# Patient Record
Sex: FEMALE | Race: WHITE | NOT HISPANIC OR LATINO | Employment: OTHER | URBAN - METROPOLITAN AREA
[De-identification: names, ages, dates, MRNs, and addresses within clinical notes are randomized per-mention and may not be internally consistent; named-entity substitution may affect disease eponyms.]

---

## 2024-05-15 ENCOUNTER — TELEPHONE (OUTPATIENT)
Dept: GASTROENTEROLOGY | Facility: CLINIC | Age: 74
End: 2024-05-15
Payer: MEDICARE

## 2024-05-15 NOTE — TELEPHONE ENCOUNTER
Returned patients call. [facility] to speak to patient. 5/15/24 LRA       ----- Message from Svitlana Zapata sent at 5/13/2024  2:23 PM CDT -----  Regarding: Appointment  Contact: patient  Per phone call with patient, she stated that she has been having stomach problems with bloating and she would like to schedule an appointment to see the physician.and also schedule for a colonoscopy to be done.  Please return call at [phone].    Thanks,  SJ

## 2024-05-24 ENCOUNTER — TELEPHONE (OUTPATIENT)
Dept: GASTROENTEROLOGY | Facility: CLINIC | Age: 74
End: 2024-05-24
Payer: MEDICARE

## 2024-05-24 NOTE — TELEPHONE ENCOUNTER
----- Message from Nikki Ku sent at 5/24/2024  3:15 PM CDT -----  Contact: Patient  Patient called to consult with nurse or staff regarding an appointment. She states she missed a phone call and would like a call back. She can be reached at [phone]. Thanks/

## 2024-05-24 NOTE — TELEPHONE ENCOUNTER
I contacted patient and asked her how we can help her. Patient states she would like to schedule a colonoscopy. Patient states she is a previous patient of Dr. Ragsdale. She is due for her 5 year colonoscopy. Patient states she is currently not having any issues. Please advise. BF

## 2024-05-31 NOTE — TELEPHONE ENCOUNTER
Patient is due for colonoscopy. She is medicare so would need OV with NP. Schedule OV for colon consult.  HUONG

## 2024-06-04 NOTE — TELEPHONE ENCOUNTER
Called and left patient a message to return my call .  [facility]

## 2024-06-05 ENCOUNTER — TELEPHONE (OUTPATIENT)
Dept: GASTROENTEROLOGY | Facility: CLINIC | Age: 74
End: 2024-06-05
Payer: MEDICARE

## 2024-06-05 NOTE — TELEPHONE ENCOUNTER
----- Message from Shanti Muniz sent at 6/5/2024 10:47 AM CDT -----  Contact: self  Type:  Patient Returning Call    Who Called:Bethanie Wilcox  Who Left Message for Patient:Samanta  Does the patient know what this is regarding?:unsure  Would the patient rather a call back or a response via [employer] [facility]? Call back  Best Call Back Number:[phone]  Additional Information: n/a

## 2024-06-06 NOTE — TELEPHONE ENCOUNTER
Called and spoke with patient and gave her an appt date and she stated they are out of town a lot but surely she would be here .  lukasz

## 2024-06-21 ENCOUNTER — TELEPHONE (OUTPATIENT)
Dept: GASTROENTEROLOGY | Facility: CLINIC | Age: 74
End: 2024-06-21

## 2024-06-21 ENCOUNTER — OFFICE VISIT (OUTPATIENT)
Dept: GASTROENTEROLOGY | Facility: CLINIC | Age: 74
End: 2024-06-21
Payer: MEDICARE

## 2024-06-21 VITALS
WEIGHT: 145.81 LBS | SYSTOLIC BLOOD PRESSURE: 146 MMHG | HEART RATE: 59 BPM | OXYGEN SATURATION: 99 % | HEIGHT: 64 IN | BODY MASS INDEX: 24.89 KG/M2 | DIASTOLIC BLOOD PRESSURE: 66 MMHG

## 2024-06-21 DIAGNOSIS — R14.0 BLOATING: ICD-10-CM

## 2024-06-21 DIAGNOSIS — R13.10 DYSPHAGIA, UNSPECIFIED TYPE: Primary | ICD-10-CM

## 2024-06-21 DIAGNOSIS — Z12.11 SCREENING FOR COLON CANCER: ICD-10-CM

## 2024-06-21 DIAGNOSIS — Z86.010 HISTORY OF COLON POLYPS: ICD-10-CM

## 2024-06-21 DIAGNOSIS — Z79.1 NSAID LONG-TERM USE: ICD-10-CM

## 2024-06-21 RX ORDER — POTASSIUM CHLORIDE 750 MG/1
TABLET, EXTENDED RELEASE ORAL
COMMUNITY

## 2024-06-21 RX ORDER — ATORVASTATIN CALCIUM 40 MG/1
40 TABLET, FILM COATED ORAL
COMMUNITY
Start: 2024-05-02

## 2024-06-21 RX ORDER — ALBUTEROL SULFATE 90 UG/1
AEROSOL, METERED RESPIRATORY (INHALATION)
COMMUNITY
Start: 2024-05-17

## 2024-06-21 RX ORDER — MONTELUKAST SODIUM 10 MG/1
TABLET ORAL
COMMUNITY

## 2024-06-21 RX ORDER — ALPRAZOLAM 0.5 MG/1
TABLET ORAL
COMMUNITY
Start: 2024-02-28 | End: 2024-06-21

## 2024-06-21 RX ORDER — SOD SULF/POT CHLORIDE/MAG SULF 1.479 G
TABLET ORAL
Qty: 24 TABLET | Refills: 0 | Status: SHIPPED | OUTPATIENT
Start: 2024-06-21

## 2024-06-21 RX ORDER — FLUTICASONE FUROATE, UMECLIDINIUM BROMIDE AND VILANTEROL TRIFENATATE 100; 62.5; 25 UG/1; UG/1; UG/1
POWDER RESPIRATORY (INHALATION)
COMMUNITY
Start: 2024-05-17

## 2024-06-21 RX ORDER — HYDROCHLOROTHIAZIDE 12.5 MG/1
12.5 TABLET ORAL DAILY
COMMUNITY

## 2024-06-21 RX ORDER — OMEPRAZOLE 40 MG/1
CAPSULE, DELAYED RELEASE ORAL
COMMUNITY

## 2024-06-21 RX ORDER — MELOXICAM 15 MG/1
TABLET ORAL
COMMUNITY

## 2024-06-21 RX ORDER — ASPIRIN 81 MG/1
TABLET ORAL
COMMUNITY

## 2024-06-21 NOTE — LETTER
June 21, 2024        Gustabo Avilez MD  [address]  [address]  [place] [address]             [facility]  [address] [address]  Phone: [phone]  Fax: [phone]   Patient: Bethanie Wilcox   MR Number: [id number]   YOB: 1950   Date of Visit: 6/21/2024       Dear Dr. Avilez:    Thank you for referring Bethanie Wilcox to me for evaluation. Attached you will find relevant portions of my assessment and plan of care.    If you have questions, please do not hesitate to call me. I look forward to following Bethanie Wilcox along with you.    Sincerely,      Jessica Slaughter, NP            CC  No Recipients    Enclosure

## 2024-06-21 NOTE — PROGRESS NOTES
Clinic Note    Reason for visit:  The primary encounter diagnosis was Dysphagia, unspecified type. Diagnoses of Bloating, NSAID long-term use, History of colon polyps, and Screening for colon cancer were also pertinent to this visit.    PCP: Gustabo Avilez       HPI:  This is a 74 y.o. female who is here to establish care. Patient previously seen by Dr. Ragsdale. Patient states over the last few years she has lost about 50 pounds that was intentional with healthy diet, although she feels her stomach is still distended. After eating, sometimes her abdomen is more swollen. She avoids dairy and fried foods.   She had her gallbladder removed in 2016 and since then she can't tolerate fatty/fried foods and would have diarrhea after eating it. She has daily BMs. No blood in stool. She is taking omeprazole 40 mg daily since she was taking meloxicam. No recent imaging. Has trouble swallowing Ca pill at times but no issues with food/liquids.       CT abd w/ and w/o 12/2020: A few calcified splenic granulomas. At least 10 hepatic cysts of varying sizes are seen including a dominant left hepatic 2.8 cm cyst. No GB. Dilated CBD to 1.4 cm could also be related to cholecystectomy sequela. Pancreatic duct is mildly prominent at 0.3 cm. No discrete pancreatic mass is identified. Duodenal diverticulum.     Colonoscopy 6/12/2019: Sphincter tone seemed adequate. Moderate diverticulosis of the sigmoid colon. Small IH. CBx nl, 1 TA. Repeat colon in 5 yr.     EGD 3/7/2016: GBx react gastropathy w/o Hp. DBx nl    Review of Systems   Constitutional:  Positive for fatigue. Negative for fever and unexpected weight change.   HENT:  Positive for postnasal drip. Negative for mouth sores, sore throat and trouble swallowing.    Eyes:  Negative for pain, discharge and eye dryness.   Respiratory:  Positive for cough and shortness of breath. Negative for apnea, choking, chest tightness and wheezing.    Cardiovascular:  Negative for chest pain,  palpitations and leg swelling.   Gastrointestinal:  Positive for abdominal distention. Negative for abdominal pain, anal bleeding, blood in stool, change in bowel habit, constipation, diarrhea, nausea, rectal pain, vomiting, reflux and fecal incontinence.   Genitourinary:  Negative for bladder incontinence, dysuria and hematuria.   Musculoskeletal:  Negative for arthralgias, back pain and joint swelling.   Integumentary:  Negative for color change and rash.   Allergic/Immunologic: Negative for environmental allergies and food allergies.   Neurological:  Negative for seizures and headaches.   Hematological:  Negative for adenopathy. Does not bruise/bleed easily.        Past Medical History:   Diagnosis Date    Arthritis     Asthma     Essential (primary) hypertension     High cholesterol     Personal history of colonic polyps      Past Surgical History:   Procedure Laterality Date    CATARACT EXTRACTION      CHOLECYSTECTOMY  2016    COLONOSCOPY      EGD - EXTERNAL RESULT      HIP REPLACEMENT ARTHROPLASTY Right     2022    LUMPECTOMY, BREAST      TONSILLECTOMY       Family History   Problem Relation Name Age of Onset    Breast cancer Mother      Heart disease Mother      Heart disease Father      Breast cancer Maternal Aunt      Colon cancer Neg Hx      Esophageal cancer Neg Hx      Irritable bowel syndrome Neg Hx      Liver cancer Neg Hx      Liver disease Neg Hx      Rectal cancer Neg Hx      Stomach cancer Neg Hx       Social History     Tobacco Use    Smoking status: Never    Smokeless tobacco: Never   Substance Use Topics    Alcohol use: Not Currently    Drug use: Never     Review of patient's allergies indicates:   Allergen Reactions    Clindamycin Diarrhea and Other (See Comments)     Blood pressure spiked    Penicillins       Medication List with Changes/Refills   New Medications    SOD SULF-POT CHLORIDE-MAG SULF (SUTAB) 1.479-0.188- 0.225 GRAM TABLET    Take according to package instructions with indicated  "amount of water. No breakfast day before test. May substitute with Suprep, Clenpiq, Plenvu, Moviprep or GoLytely based on Rx plan and patient preference.   Current Medications    ALBUTEROL (PROVENTIL/VENTOLIN HFA) 90 MCG/ACTUATION INHALER    INHALE 2 PUFF AS DIRECTED EVERY FOUR HOURS AS NEEDED AS NEEDED FOR WHEEZING OR SHORTNESS OF BREATH    ASPIRIN (ECOTRIN) 81 MG EC TABLET    1 tablet Orally Once a day for 30 day(s)    ATORVASTATIN (LIPITOR) 40 MG TABLET    Take 40 mg by mouth.    CALCIUM CARBONATE-VITAMIN D3 600 MG-20 MCG (800 UNIT) CHEW    1 tablet with a meal Orally Once a day for 30 day(s)    HYDROCHLOROTHIAZIDE (HYDRODIURIL) 12.5 MG TAB    Take 12.5 mg by mouth once daily.    MELOXICAM (MOBIC) 15 MG TABLET    1 tablet Orally Once a day for 30 day(s)    MONTELUKAST (SINGULAIR) 10 MG TABLET    1 tablet Orally Once a day for 30 day(s)    OMEPRAZOLE (PRILOSEC) 40 MG CAPSULE    1 capsule 30 minutes before morning meal Orally Once a day for 30 day(s)    POTASSIUM CHLORIDE (KLOR-CON) 10 MEQ TBSR    1 tablet with food Orally 4 times a week    TRELEGY ELLIPTA 100-62.5-25 MCG DSDV    TAKE 1 PUFF BY MOUTH EVERY DAY   Discontinued Medications    ALPRAZOLAM (XANAX) 0.5 MG TABLET    TAKE ONE TABLET BY MOUTH BEFORE         Vital Signs:  BP (!) 146/66 (BP Location: Left arm, Patient Position: Sitting)   Pulse (!) 59   Ht 5' 4" (1.626 m)   Wt 66.1 kg (145 lb 12.8 oz)   SpO2 99%   BMI 25.03 kg/m²        Physical Exam  Vitals reviewed.   Constitutional:       General: She is awake. She is not in acute distress.     Appearance: Normal appearance. She is well-developed. She is not ill-appearing, toxic-appearing or diaphoretic.   HENT:      Head: Normocephalic and atraumatic.      Nose: Nose normal.      Mouth/Throat:      Mouth: Mucous membranes are moist.      Pharynx: Oropharynx is clear. No oropharyngeal exudate or posterior oropharyngeal erythema.   Eyes:      General: Lids are normal. Gaze aligned appropriately. No " scleral icterus.        Right eye: No discharge.         Left eye: No discharge.      Conjunctiva/sclera: Conjunctivae normal.   Neck:      Trachea: Trachea normal.   Cardiovascular:      Rate and Rhythm: Normal rate and regular rhythm.      Pulses:           Radial pulses are 2+ on the right side and 2+ on the left side.   Pulmonary:      Effort: Pulmonary effort is normal. No respiratory distress.      Breath sounds: No stridor. No wheezing.   Chest:      Chest wall: No tenderness.   Abdominal:      General: Bowel sounds are normal. There is no distension.      Palpations: Abdomen is soft. There is no fluid wave, hepatomegaly or mass.      Tenderness: There is no abdominal tenderness. There is no guarding or rebound.   Musculoskeletal:         General: No tenderness or deformity.      Cervical back: Full passive range of motion without pain and neck supple. No tenderness.      Right lower leg: No edema.      Left lower leg: No edema.   Lymphadenopathy:      Cervical: No cervical adenopathy.   Skin:     General: Skin is warm and dry.      Capillary Refill: Capillary refill takes less than 2 seconds.      Coloration: Skin is not cyanotic, jaundiced or pale.   Neurological:      General: No focal deficit present.      Mental Status: She is alert and oriented to person, place, and time.      Motor: No tremor.   Psychiatric:         Attention and Perception: Attention normal.         Mood and Affect: Mood and affect normal.         Speech: Speech normal.         Behavior: Behavior normal. Behavior is cooperative.            All of the data above and below has been reviewed by myself and any further interpretations will be reflected in the assessment and plan.   The data includes review of external notes, and independent interpretation of lab results, procedures, x-rays, and imaging reports.      Assessment:  Dysphagia, unspecified type  -     Ambulatory Referral to External Surgery    Bloating    NSAID long-term  use    History of colon polyps  -     Ambulatory Referral to External Surgery    Screening for colon cancer  -     Ambulatory Referral to External Surgery  -     sod sulf-pot chloride-mag sulf (SUTAB) 1.479-0.188- 0.225 gram tablet; Take according to package instructions with indicated amount of water. No breakfast day before test. May substitute with Suprep, Clenpiq, Plenvu, Moviprep or GoLytely based on Rx plan and patient preference.  Dispense: 24 tablet; Refill: 0    Colon due.  Bloating likely food intolerances. Plan for EGD w/GDBx. Sucrase test/gas-X v digestive enzymes and discussed low FODMAP diet. She will try stopping meloxicam then lower omeprazole dose and eventually may stop it completely if able. She has not had any recent abd imaging and discussed possibly getting CT abd/pelvis if her symptoms persist.     Recommendations:  Schedule upper and lower endoscopies with Dr. Dowling.   Take Gas-X with each meal.   Complete Sucrase Breath Test.  Low FODMAP diet.  Try stopping meloxicam and may take Tylenol for joint pain if needed. Once you stop meloxicam, you may try going down to omeprazole 20 mg daily over the counter instead of omeprazole 40 mg daily or take omeprazole 40 mg every other day.         Risks, benefits, and alternatives of medical management, any associated procedures, and/or treatment discussed with the patient. Patient given opportunity to ask questions and voices understanding. Patient has elected to proceed with the recommended care modalities as discussed.    Follow up if symptoms worsen or fail to improve.    Order summary:  Orders Placed This Encounter   Procedures    Ambulatory Referral to External Surgery        Instructed patient to notify my office if they have not been contacted within two weeks after any procedures, submitting any samples (biopsies, blood, stool, urine, etc.) or after any imaging (X-ray, CT, MRI, etc.).      Jessica Slaughter NP    This document may have been  created using a voice recognition transcribing system. Incorrect words or phrases may have been missed during proofreading. Please interpret accordingly or contact me for clarification.

## 2024-06-21 NOTE — PATIENT INSTRUCTIONS
Schedule upper and lower endoscopies with Dr. Dowling.   Take Gas-X with each meal.   Complete Sucrase Breath Test.  Try stopping meloxicam and may take Tylenol for joint pain if needed. Once you stop meloxicam, you may try going down to omeprazole 20 mg daily over the counter instead of omeprazole 40 mg daily or take omeprazole 40 mg every other day.   Low FODMAP diet.        Please notify my office if you have not been contacted within two weeks after any procedures, submitting any samples (biopsies, blood, stool, urine, etc.) or after any imaging (X-ray, CT, MRI, etc.).

## 2024-06-21 NOTE — TELEPHONE ENCOUNTER
Patient was given instructions and they were reviewed with patient. Patient was given subtab coupon. Patient was given AVS with apt details. Order for colonoscopy faxed to central scheduling @ [facility]. No pa required. Patient was given breath test and form.  6/21/24 LRA

## 2024-07-30 ENCOUNTER — TELEPHONE (OUTPATIENT)
Dept: GASTROENTEROLOGY | Facility: CLINIC | Age: 74
End: 2024-07-30
Payer: MEDICARE

## 2024-07-31 NOTE — TELEPHONE ENCOUNTER
Contacted patient and let her know results. Patient also states that she is scheduled in November for scope and would like to be scheduled sooner. Patient is already listed on cancellation list. I informed patient that if we have any cancellations, we can contact her to get her bumped up, but at this time, Dr. Dowling's availability of scheduling procedures sooner, is limited. Patient stated understanding. BF

## 2024-10-31 ENCOUNTER — TELEPHONE (OUTPATIENT)
Dept: GASTROENTEROLOGY | Facility: CLINIC | Age: 74
End: 2024-10-31
Payer: MEDICARE

## 2024-10-31 VITALS — HEIGHT: 64 IN | BODY MASS INDEX: 24.92 KG/M2 | WEIGHT: 146 LBS

## 2024-10-31 DIAGNOSIS — Z86.0100 HISTORY OF COLON POLYPS: ICD-10-CM

## 2024-10-31 DIAGNOSIS — Z12.11 SCREENING FOR COLON CANCER: ICD-10-CM

## 2024-10-31 DIAGNOSIS — R13.10 DYSPHAGIA, UNSPECIFIED TYPE: Primary | ICD-10-CM

## 2024-11-07 ENCOUNTER — NURSE TRIAGE (OUTPATIENT)
Dept: ADMINISTRATIVE | Facility: CLINIC | Age: 74
End: 2024-11-07
Payer: MEDICARE

## 2024-11-08 ENCOUNTER — OUTSIDE PLACE OF SERVICE (OUTPATIENT)
Dept: GASTROENTEROLOGY | Facility: CLINIC | Age: 74
End: 2024-11-08
Payer: MEDICARE

## 2024-11-08 LAB — CRC RECOMMENDATION EXT: NORMAL

## 2024-11-08 PROCEDURE — 43239 EGD BIOPSY SINGLE/MULTIPLE: CPT | Mod: 51,,, | Performed by: INTERNAL MEDICINE

## 2024-11-08 PROCEDURE — G0105 COLORECTAL SCRN; HI RISK IND: HCPCS | Mod: ,,, | Performed by: INTERNAL MEDICINE

## 2024-11-08 NOTE — TELEPHONE ENCOUNTER
Colonoscopy and EGD scheduled tomorrow. Has to be at hospital at 0530. Began prep at 1730, shortly after vomited prep.   Current abdominal pain, cramping, above belly button 5-6/10.  Prep is only pills. States she is due for 12 more pills at 2200. Advised to take half of the dose at 2100 and then the other half of the pills at 2200. Continue to drink fluids.   Advised to call back with any further questions, concerns, new/worsening symptoms. Pt VU.       Reason for Disposition   Abdominal bloating, cramping, nausea, or vomiting while drinking bowel prep, questions about    Additional Information   Negative: Shock suspected (e.g., cold/pale/clammy skin, too weak to stand, low BP, rapid pulse)   Negative: Difficult to awaken or acting confused (e.g., disoriented, slurred speech)   Negative: Passed out (e.g., fainted, lost consciousness, blacked out and was not responding)   Negative: Sounds like a life-threatening emergency to the triager   Negative: SEVERE abdomen pain (e.g., excruciating)   Negative: SEVERE rectal bleeding (large blood clots; on and off, or constant bleeding)   Negative: SEVERE dizziness (e.g., unable to stand, requires support to walk, feels like passing out now)   Negative: SEVERE vomiting (e.g., 6 or more times/day)   Negative: [1] MODERATE rectal bleeding (small blood clots, passing blood without stool, or toilet water turns red) AND [2] more than once   Negative: [1] MILD-MODERATE abdomen pain AND [2] constant AND [3] present > 2 hours   Negative: [1] Drinking very little AND [2] dehydration suspected (e.g., no urine > 12 hours, very dry mouth, very lightheaded)   Negative: Patient sounds very sick or weak to the triager   Negative: Fever > 100.4 F (38.0 C)   Negative: [1] Abdominal bloating, cramping, nausea, or vomiting while drinking bowel prep AND [2] CANNOT finish bowel prep AND [3] has tried recommended Care Advice   Negative: [1] Caller has URGENT question or concern AND [2] triager  unable to answer question   Negative: [1] MILD-MODERATE abdomen pain (e.g., does not interfere with normal activities) AND [2] pain comes and goes (cramps) [3] lasting > 48 hours   Negative: [1] MILD to MODERATE vomiting (e.g., 1 to 5 times a day) AND [2] lasting > 24 hours   Negative: Any rectal bleeding occurring > 24 hours after colonoscopy   Negative: [1] Caller has NON-URGENT question AND [2] triager unable to answer question   Negative: Abdominal cramping and bloating after colonoscopy, questions about   Negative: Rectal bleeding (slight; streaks or less than 1 teaspoon or 5 ml) after colonoscopy, questions about   Negative: Passing gas (flatus) after colonoscopy, questions about    Protocols used: Colonoscopy Symptoms and [id number]

## 2024-11-18 ENCOUNTER — TELEPHONE (OUTPATIENT)
Dept: GASTROENTEROLOGY | Facility: CLINIC | Age: 74
End: 2024-11-18
Payer: MEDICARE

## 2024-11-19 NOTE — TELEPHONE ENCOUNTER
DBx nl, GBx wnl w/o Hp, EBx benign, repeat colonoscopy in 5 years.     Notify patient. No signs of infection, precancerous cells or Celiac disease on the upper endoscopy biopsies.  Repeat colonoscopy in 5 years. Confirm recall tab in appointment desk is up-to-date (update if needed). Schedule next available NP OV as a back up and notify us sooner if any issues..  NBP

## 2024-11-19 NOTE — TELEPHONE ENCOUNTER
S/W pt & informed her no signs of infection, precancerous cells or Celiac disease on the upper endoscopy biopsies. Repeat colonoscopy in 5 years 11/8/29.  Recall tab is up to date.-NEYDA

## 2024-12-05 ENCOUNTER — DOCUMENTATION ONLY (OUTPATIENT)
Dept: GASTROENTEROLOGY | Facility: CLINIC | Age: 74
End: 2024-12-05
Payer: MEDICARE

## 2025-02-21 ENCOUNTER — TELEPHONE (OUTPATIENT)
Dept: GASTROENTEROLOGY | Facility: CLINIC | Age: 75
End: 2025-02-21
Payer: MEDICARE

## 2025-02-21 NOTE — TELEPHONE ENCOUNTER
----- Message from Janay sent at 2/21/2025 11:12 AM CST -----  Patient is calling to reschedule appointment on 3/3/25 please call her back at [phone]

## 2025-02-21 NOTE — TELEPHONE ENCOUNTER
S/W pt she wanted to reschedule her upcoming o/v. It is now rescheduled for 3/14 at 10 AM with LUKE FRANCISCO

## 2025-03-07 ENCOUNTER — TELEPHONE (OUTPATIENT)
Dept: GASTROENTEROLOGY | Facility: CLINIC | Age: 75
End: 2025-03-07
Payer: MEDICARE

## 2025-03-07 NOTE — TELEPHONE ENCOUNTER
----- Message from Janay sent at 3/6/2025  4:29 PM CST -----  Patient is calling to reschedule appointment on 3/14/25.  Please call her back at [phone]

## 2025-03-14 ENCOUNTER — TELEPHONE (OUTPATIENT)
Dept: GASTROENTEROLOGY | Facility: CLINIC | Age: 75
End: 2025-03-14

## 2025-03-14 NOTE — TELEPHONE ENCOUNTER
Patient called to ramo her apt. Patient is now bi for 5/5/25 @ 3:00. Patient is aware of new date and time. 3/14/25 LRA       ----- Message from Belen sent at 3/14/2025  4:26 PM CDT -----  Contact: self  Type:  Patient Returning CallWho Called:Bethanie Rosenbaum Left Message for Patient:unsureDoes the patient know what this is regarding?:reschedule apptWould the patient rather a call back or a response via [facility]? [facility] Call Back Number:[phone] Information: n/a

## 2025-05-05 ENCOUNTER — TELEPHONE (OUTPATIENT)
Dept: GASTROENTEROLOGY | Facility: CLINIC | Age: 75
End: 2025-05-05

## 2025-05-05 ENCOUNTER — OFFICE VISIT (OUTPATIENT)
Dept: GASTROENTEROLOGY | Facility: CLINIC | Age: 75
End: 2025-05-05
Payer: MEDICARE

## 2025-05-05 VITALS
SYSTOLIC BLOOD PRESSURE: 109 MMHG | HEIGHT: 64 IN | WEIGHT: 123.19 LBS | HEART RATE: 57 BPM | OXYGEN SATURATION: 96 % | DIASTOLIC BLOOD PRESSURE: 67 MMHG | BODY MASS INDEX: 21.03 KG/M2

## 2025-05-05 DIAGNOSIS — R63.4 WEIGHT LOSS: ICD-10-CM

## 2025-05-05 DIAGNOSIS — K83.8 DILATED BILE DUCT: ICD-10-CM

## 2025-05-05 DIAGNOSIS — R13.10 DYSPHAGIA, UNSPECIFIED TYPE: ICD-10-CM

## 2025-05-05 DIAGNOSIS — R14.0 ABDOMINAL DISTENSION: ICD-10-CM

## 2025-05-05 DIAGNOSIS — K59.00 CONSTIPATION, UNSPECIFIED CONSTIPATION TYPE: Primary | ICD-10-CM

## 2025-05-05 DIAGNOSIS — Z86.0100 HISTORY OF COLON POLYPS: ICD-10-CM

## 2025-05-05 DIAGNOSIS — R14.0 BLOATING: ICD-10-CM

## 2025-05-05 DIAGNOSIS — R10.13 EPIGASTRIC PAIN: ICD-10-CM

## 2025-05-05 PROCEDURE — 99215 OFFICE O/P EST HI 40 MIN: CPT | Mod: S$PBB,,,

## 2025-05-05 RX ORDER — RIFAMPIN 300 MG/1
300 CAPSULE ORAL
COMMUNITY

## 2025-05-05 RX ORDER — METOPROLOL SUCCINATE 100 MG/1
100 TABLET, EXTENDED RELEASE ORAL EVERY MORNING
COMMUNITY
Start: 2025-02-28

## 2025-05-05 RX ORDER — LOSARTAN POTASSIUM 100 MG/1
100 TABLET ORAL
COMMUNITY
Start: 2025-05-05

## 2025-05-05 RX ORDER — CLOBETASOL PROPIONATE 0.5 MG/ML
SOLUTION TOPICAL
COMMUNITY
Start: 2025-02-07

## 2025-05-05 RX ORDER — ETHAMBUTOL HYDROCHLORIDE 400 MG/1
1600 TABLET, FILM COATED ORAL
COMMUNITY

## 2025-05-05 RX ORDER — AZITHROMYCIN 500 MG/1
500 TABLET, FILM COATED ORAL
COMMUNITY
Start: 2025-02-14 | End: 2026-02-14

## 2025-05-05 RX ORDER — CICLOPIROX 1 G/100ML
SHAMPOO TOPICAL
COMMUNITY
Start: 2025-04-23

## 2025-05-05 NOTE — TELEPHONE ENCOUNTER
Patient was given lab orders. Patient was given AVS with apt details. Patients ct order was faxed to central scheduling. 5/5/25 LRA

## 2025-05-05 NOTE — PROGRESS NOTES
Clinic Note    Reason for visit:  The primary encounter diagnosis was Constipation, unspecified constipation type. Diagnoses of Bloating, Abdominal distension, Weight loss, Dilated bile duct, Dysphagia, unspecified type, History of colon polyps, and Epigastric pain were also pertinent to this visit.    PCP: Gustabo Avilez       HPI:  This is a 74 y.o. female who is here for a follow up. Patient with abd bloating/distention after eating. She has not had this much since last visit. She avoids dairy and fried foods. She had her gallbladder removed in 2016 and since then she can't tolerate fatty/fried foods and would have diarrhea after eating it. She has daily BMs or sometimes skips a day. She was taking omeprazole 40 mg daily since she was taking meloxicam but she got off of both after last OV. Trying to gain weight. Lowest weight was 119#. Feels she has constipation at times or will only pass smaller amount of stool.  She got off meloxicam and omeprazole after our last OV. Shortly after stopping it, she started having neck pain that hasn't been able to help. She has been taking Tylenol arthritis. Has OTC ome at home. Will take it she ends up taking NSAIDs for neck.    Patient states over the last few years she has lost about 50 pounds that was intentional with healthy diet. She had intentional weight loss at last OV. She has lost 20# since last visit 6/2024.   Had rapid weight loss that was unintentional and fatigue. Had CT chest and labs. Found to have mycobacterium avium complex 10/2024 and bronchiectasis and will be on triple therapy antibiotics for 18-24 months. She is taking rifampin 300 mg, ethambutol 400 mg, and azithromycin 500 mg all three times per week.   Will have CT chest and possibly another bronchoscopy later this month. Followed by Dr. Rasmey. Told MAC was source of weight loss.     Weight today 5/5/2025 123#  Weight 2/14/2025 130#  Weight 6/2024: 145#    2/14/2025: Cr 0.71, CMP wnl    7/18/2024  normal sucrase activity.     EGD/Colonoscopy 11/8/2024: Mild distal gastritis (potentially from prep). Sigmoid diverticulosis. IH/EH. Normal TI/colonic mucosa otherwise. DBx nl, GBx wnl w/o Hp, EBx benign, repeat colonoscopy in 5 years.    CT abd w/ and w/o 12/2020: A few calcified splenic granulomas. At least 10 hepatic cysts of varying sizes are seen including a dominant left hepatic 2.8 cm cyst. No GB. Dilated CBD to 1.4 cm could also be related to cholecystectomy sequela. Pancreatic duct is mildly prominent at 0.3 cm. No discrete pancreatic mass is identified. Duodenal diverticulum.      Colonoscopy 6/12/2019: Sphincter tone seemed adequate. Moderate diverticulosis of the sigmoid colon. Small IH. CBx nl, 1 TA. Repeat colon in 5 yr.      EGD 3/7/2016: GBx react gastropathy w/o Hp. DBx nl    Review of Systems   Constitutional:  Positive for fatigue and unexpected weight change. Negative for fever.   HENT:  Positive for postnasal drip and trouble swallowing. Negative for mouth sores and sore throat.    Eyes:  Negative for pain, discharge and eye dryness.   Respiratory:  Positive for cough and shortness of breath. Negative for apnea, choking, chest tightness and wheezing.    Cardiovascular:  Negative for chest pain, palpitations and leg swelling.   Gastrointestinal:  Positive for change in bowel habit. Negative for abdominal distention, abdominal pain, anal bleeding, blood in stool, constipation, diarrhea, nausea, rectal pain, vomiting, reflux and fecal incontinence.   Genitourinary:  Negative for bladder incontinence, dysuria and hematuria.   Musculoskeletal:  Negative for arthralgias, back pain and joint swelling.   Integumentary:  Negative for color change and rash.   Allergic/Immunologic: Negative for environmental allergies and food allergies.   Neurological:  Negative for seizures and headaches.   Hematological:  Negative for adenopathy. Does not bruise/bleed easily.        Past Medical History:   Diagnosis  Date    Arthritis     Asthma     BMI 21.0-21.9, adult     Bronchiectasis, uncomplicated     Essential (primary) hypertension     High cholesterol     Mycobacterium avium complex 10/2024    Personal history of colonic polyps      Past Surgical History:   Procedure Laterality Date    BRONCHOSCOPY      CATARACT EXTRACTION      CHOLECYSTECTOMY  2016    COLONOSCOPY      EGD - EXTERNAL RESULT      HIP REPLACEMENT ARTHROPLASTY Right     2022    LUMPECTOMY, BREAST      TONSILLECTOMY       Family History   Problem Relation Name Age of Onset    Breast cancer Mother      Heart disease Mother      Heart disease Father      Breast cancer Maternal Aunt      Colon cancer Neg Hx      Esophageal cancer Neg Hx      Irritable bowel syndrome Neg Hx      Liver cancer Neg Hx      Liver disease Neg Hx      Rectal cancer Neg Hx      Stomach cancer Neg Hx      Celiac disease Neg Hx       Social History[1]  Review of patient's allergies indicates:   Allergen Reactions    Ramipril Shortness Of Breath    Arb-angiotensin receptor antagonist     Clindamycin Diarrhea and Other (See Comments)     Blood pressure spiked    Penicillins       Medication List with Changes/Refills   Current Medications    ALBUTEROL (PROVENTIL/VENTOLIN HFA) 90 MCG/ACTUATION INHALER    INHALE 2 PUFF AS DIRECTED EVERY FOUR HOURS AS NEEDED AS NEEDED FOR WHEEZING OR SHORTNESS OF BREATH    ASPIRIN (ECOTRIN) 81 MG EC TABLET    1 tablet Orally Once a day for 30 day(s)    ATORVASTATIN (LIPITOR) 40 MG TABLET    Take 40 mg by mouth.    AZITHROMYCIN (ZITHROMAX) 500 MG TABLET    500 mg 3 (three) times a week.    CALCIUM CARBONATE-VITAMIN D3 600 MG-20 MCG (800 UNIT) CHEW    1 tablet with a meal Orally Once a day for 30 day(s)    CICLOPIROX 1 % SHAMPOO    WASH HAIR 2 TO 3 TIMES A WEEK .    CLOBETASOL (TEMOVATE) 0.05 % EXTERNAL SOLUTION    APPLY TO SCALP 1-2 TIMES A DAY.    ETHAMBUTOL (MYAMBUTOL) 400 MG TAB    1,600 mg 3 (three) times a week.    HYDROCHLOROTHIAZIDE (HYDRODIURIL) 12.5  "MG TAB    Take 12.5 mg by mouth once daily.    LOSARTAN (COZAAR) 100 MG TABLET    Take 100 mg by mouth.    METOPROLOL SUCCINATE (TOPROL-XL) 100 MG 24 HR TABLET    Take 100 mg by mouth every morning.    MONTELUKAST (SINGULAIR) 10 MG TABLET    1 tablet Orally Once a day for 30 day(s)    POTASSIUM CHLORIDE (KLOR-CON) 10 MEQ TBSR    1 tablet with food Orally 4 times a week    RIFAMPIN (RIFADIN) 300 MG CAPSULE    300 mg 3 (three) times a week.   Discontinued Medications    MELOXICAM (MOBIC) 15 MG TABLET    1 tablet Orally Once a day for 30 day(s)    OMEPRAZOLE (PRILOSEC) 40 MG CAPSULE    1 capsule 30 minutes before morning meal Orally Once a day for 30 day(s)    SOD SULF-POT CHLORIDE-MAG SULF (SUTAB) 1.479-0.188- 0.225 GRAM TABLET    Take according to package instructions with indicated amount of water. No breakfast day before test. May substitute with Suprep, Clenpiq, Plenvu, Moviprep or GoLytely based on Rx plan and patient preference.    TRELEGY ELLIPTA 100-62.5-25 MCG DSDV    TAKE 1 PUFF BY MOUTH EVERY DAY         Vital Signs:  /67 (BP Location: Left arm, Patient Position: Sitting)   Pulse (!) 57   Ht 5' 4" (1.626 m)   Wt 55.9 kg (123 lb 3.2 oz)   SpO2 96%   BMI 21.15 kg/m²        Physical Exam  Vitals reviewed.   Constitutional:       General: She is awake. She is not in acute distress.     Appearance: Normal appearance. She is well-developed. She is not ill-appearing, toxic-appearing or diaphoretic.   HENT:      Head: Normocephalic and atraumatic.      Nose: Nose normal.      Mouth/Throat:      Mouth: Mucous membranes are moist.      Pharynx: Oropharynx is clear. No oropharyngeal exudate or posterior oropharyngeal erythema.   Eyes:      General: Lids are normal. Gaze aligned appropriately. No scleral icterus.        Right eye: No discharge.         Left eye: No discharge.      Conjunctiva/sclera: Conjunctivae normal.   Neck:      Trachea: Trachea normal.   Cardiovascular:      Rate and Rhythm: Normal " rate and regular rhythm.      Pulses:           Radial pulses are 2+ on the right side and 2+ on the left side.   Pulmonary:      Effort: Pulmonary effort is normal. No respiratory distress.      Breath sounds: No stridor. No wheezing.   Chest:      Chest wall: No tenderness.   Abdominal:      General: Bowel sounds are normal. There is no distension.      Palpations: Abdomen is soft. There is no fluid wave, hepatomegaly or mass.      Tenderness: There is no abdominal tenderness. There is no guarding or rebound.   Musculoskeletal:         General: No tenderness or deformity.      Cervical back: No tenderness.      Right lower leg: No edema.      Left lower leg: No edema.   Lymphadenopathy:      Cervical: No cervical adenopathy.   Skin:     General: Skin is warm and dry.      Capillary Refill: Capillary refill takes less than 2 seconds.      Coloration: Skin is not cyanotic, jaundiced or pale.   Neurological:      General: No focal deficit present.      Mental Status: She is alert and oriented to person, place, and time.      Motor: No tremor.   Psychiatric:         Attention and Perception: Attention normal.         Mood and Affect: Mood and affect normal.         Speech: Speech normal.         Behavior: Behavior normal. Behavior is cooperative.            All of the data above and below has been reviewed by myself and any further interpretations will be reflected in the assessment and plan.   The data includes review of external notes, and independent interpretation of lab results, procedures, x-rays, and imaging reports.      Assessment:  Constipation, unspecified constipation type    Bloating    Abdominal distension    Weight loss  -     Basic Metabolic Panel; Future; Expected date: 05/05/2025  -     CT Abdomen With IV Contrast NO Oral Contrast; Future    Dilated bile duct    Dysphagia, unspecified type    History of colon polyps    Epigastric pain  -     Basic Metabolic Panel; Future; Expected date: 05/05/2025  -      CT Abdomen With IV Contrast NO Oral Contrast; Future      Colon due 2029.   Bloating/distention after eating. Sucrase activity nl. EGD wnl. Certain foods make it worse. Epigastric pain. Has improved since last visit. Continuing to lose weight. Weight loss likely due to MAC infection. 2/2025 Cr nl. Will order CT pancreas to ensure no lesion etc.   Off omeprazole.   Constipation, discussed MiraLAX and Squatty Potty.   Advised her to discuss PT with PCP for her neck pain since she would like to avoid NSAIDs.   CT abd W WO 2020 with pancreatic duct is mildly prominent at 0.3 cm. Dilated CBD to 1.4 cm could also be related to cholecystectomy sequela. At least 10 hepatic cysts of varying sizes are seen including a dominant left hepatic 2.8 cm cyst.     Recommendations:    Schedule CT scan.   Complete blood work the week prior to scan.   Drink Boost or Ensure daily to supplement meals but not to replace meals.  Begin taking MiraLAX. Start with 1/4 capful daily, and titrate dose up or down until you are having daily, soft bowel movements.  Begin using Squatty Potty with each restroom visit at home.     If any tests, procedures, or imaging has been ordered and you are not contacted to schedule within 1-2 weeks, then you may call the central scheduling department directly at [phone].     Risks, benefits, and alternatives of medical management, any associated procedures, and/or treatment discussed with the patient. Patient given opportunity to ask questions and voices understanding. Patient has elected to proceed with the recommended care modalities as discussed.    Follow up in about 6 months (around 11/5/2025).    Order summary:  Orders Placed This Encounter   Procedures    CT Abdomen With IV Contrast NO Oral Contrast    Basic Metabolic Panel          Instructed patient to notify my office if they have not been contacted within two weeks after any procedures, submitting any samples (biopsies, blood, stool, urine,  etc.) or after any imaging (X-ray, CT, MRI, etc.).      Jessica Slaughter NP    This document may have been created using a voice recognition transcribing system. Incorrect words or phrases may have been missed during proofreading. Please interpret accordingly or contact me for clarification.          [1]   Social History  Tobacco Use    Smoking status: Never    Smokeless tobacco: Never   Substance Use Topics    Alcohol use: Not Currently    Drug use: Never

## 2025-05-05 NOTE — LETTER
May 5, 2025        Gustabo Avilez MD  [address]  [address]  [place] [address]             [facility]  [address] [address]  Phone: [phone]  Fax: [phone]   Patient: Bethanie Wilcox   MR Number: [id number]   YOB: 1950   Date of Visit: 5/5/2025       Dear Dr. Avilez:    Thank you for referring Bethanie Wilcox to me for evaluation. Attached you will find relevant portions of my assessment and plan of care.    If you have questions, please do not hesitate to call me. I look forward to following Bethanie Wilcox along with you.    Sincerely,      Jessica Slaughter, NP            CC  No Recipients    Enclosure

## 2025-05-05 NOTE — PATIENT INSTRUCTIONS
Schedule CT scan.   Complete blood work the week prior to scan.   Drink Boost or Ensure daily to supplement meals but not to replace meals.  Begin taking MiraLAX. Start with 1/4 capful daily, and titrate dose up or down until you are having daily, soft bowel movements.  Begin using Squatty Potty with each restroom visit at home.     If any tests, procedures, or imaging has been ordered and you are not contacted to schedule within 1-2 weeks, then you may call the central scheduling department directly at [phone].   Please notify my office if you have not been contacted within two weeks after any procedures, submitting any samples (biopsies, blood, stool, urine, etc.) or after any imaging (X-ray, CT, MRI, etc.).

## 2025-05-15 ENCOUNTER — RESULTS FOLLOW-UP (OUTPATIENT)
Dept: GASTROENTEROLOGY | Facility: CLINIC | Age: 75
End: 2025-05-15

## 2025-05-20 ENCOUNTER — RESULTS FOLLOW-UP (OUTPATIENT)
Dept: GASTROENTEROLOGY | Facility: CLINIC | Age: 75
End: 2025-05-20
Payer: MEDICARE

## 2025-05-23 RX ORDER — OMEPRAZOLE 40 MG/1
40 CAPSULE, DELAYED RELEASE ORAL DAILY
Qty: 90 CAPSULE | Refills: 1 | Status: SHIPPED | OUTPATIENT
Start: 2025-05-23

## 2025-05-23 NOTE — TELEPHONE ENCOUNTER
Notify patient her pancreas appeared normal on the CT. It showed possible inflammation in her stomach. I recommend she begin taking omeprazole 40 mg daily instead of the over the counter omeprazole that she was taking as needed. We will likely need to set her up for an upper endoscopy ensure inflammation in stomach resolves but I will review the timing of that with Dr. Dowling and let her know. Omeprazole sent to [facility] on [address]    5/15/2025 CT Abd w/IV: liver cysts. No GB. Pancreas normal. Mild wall thickening of distal stomach suggesting gastritis.

## 2025-05-27 NOTE — TELEPHONE ENCOUNTER
Try calling patient again and give results below. Let her know I reviewed with Dr. Dowling and she would like the patient to come in for an OV with her in 2-3 months to see how she is doing at that time and we can discuss EGD at that OV. Notify us sooner with any issues. Schedule OV with Dr. Dowling in 2-3 months. Okay to keep her 11/2025 OV too.  MLC

## 2025-05-27 NOTE — TELEPHONE ENCOUNTER
Patient was notified of results and all remarks and voiced understanding. Patient will go  omeprazole from pharmacy today. OV scheduled on Tuesday 7/29/2025 at 1:45 pm with JENNY. ЕЛЕНА

## 2025-06-27 ENCOUNTER — TELEPHONE (OUTPATIENT)
Dept: GASTROENTEROLOGY | Facility: CLINIC | Age: 75
End: 2025-06-27
Payer: MEDICARE

## 2025-06-27 NOTE — TELEPHONE ENCOUNTER
Copied from CRM #[id number]. Topic: Appointments - Appointment Rescheduling  >> Jun 27, 2025  2:25 PM Gabby wrote:  Type:  Sooner Appointment Request    Caller is requesting a sooner appointment.  Caller declined first available appointment listed below.  Caller will not accept being placed on the waitlist and is requesting a message be sent to doctor.  Name of Caller:Bethanie Wilcox  When is the first available appointment?07/29  Symptoms:  Would the patient rather a call back or a response via [employer]? Call back  Best Call Back Number:[phone]  Additional Information: pt stating she will be out of the country, needing to reschedule appt

## 2025-06-30 ENCOUNTER — TELEPHONE (OUTPATIENT)
Dept: GASTROENTEROLOGY | Facility: CLINIC | Age: 75
End: 2025-06-30
Payer: MEDICARE

## 2025-06-30 NOTE — TELEPHONE ENCOUNTER
Copied from CRM #[id number]. Topic: General Inquiry - Return Call  >> Jun 30, 2025  4:14 PM Nat wrote:  ...Type:  Patient Returning Call    Who Called: Bethanie Wilcox  What is the call regarding?: pt missed call and is returning the call.  Would the patient rather a call back or a response via [employer]? call  Best Call Back Number: [phone] (home)  Additional Information:      Called patient got no answer left message for patient to give office a call back. -abo

## 2025-07-01 NOTE — TELEPHONE ENCOUNTER
Copied from CRM #[id number]. Topic: General Inquiry - Return Call  >> Jun 30, 2025  4:55 PM Dominga wrote:  .Type:  Patient Returning Call    Who Called:Bethanie Wilcox   Who Left Message for Patient: ESTIVEN KHAN  Does the patient know what this is regarding? Reschedule an appt  Would the patient rather a call back or a response via [employer]? Call back  Best Call Back Number:.[phone]   Additional Information:      Called patient got voicemail left message for patient to give office a call back. -abo

## 2025-07-10 ENCOUNTER — OFFICE VISIT (OUTPATIENT)
Dept: GASTROENTEROLOGY | Facility: CLINIC | Age: 75
End: 2025-07-10
Payer: MEDICARE

## 2025-07-10 ENCOUNTER — TELEPHONE (OUTPATIENT)
Dept: GASTROENTEROLOGY | Facility: CLINIC | Age: 75
End: 2025-07-10

## 2025-07-10 VITALS
OXYGEN SATURATION: 99 % | SYSTOLIC BLOOD PRESSURE: 125 MMHG | HEART RATE: 58 BPM | DIASTOLIC BLOOD PRESSURE: 65 MMHG | WEIGHT: 122 LBS | HEIGHT: 64 IN | BODY MASS INDEX: 20.83 KG/M2

## 2025-07-10 DIAGNOSIS — Z86.0100 HISTORY OF COLON POLYPS: ICD-10-CM

## 2025-07-10 DIAGNOSIS — R13.10 DYSPHAGIA, UNSPECIFIED TYPE: ICD-10-CM

## 2025-07-10 DIAGNOSIS — R10.13 EPIGASTRIC PAIN: ICD-10-CM

## 2025-07-10 DIAGNOSIS — K21.9 GASTROESOPHAGEAL REFLUX DISEASE, UNSPECIFIED WHETHER ESOPHAGITIS PRESENT: ICD-10-CM

## 2025-07-10 DIAGNOSIS — R63.4 WEIGHT LOSS: Primary | ICD-10-CM

## 2025-07-10 DIAGNOSIS — K59.00 CONSTIPATION, UNSPECIFIED CONSTIPATION TYPE: ICD-10-CM

## 2025-07-10 PROCEDURE — 99214 OFFICE O/P EST MOD 30 MIN: CPT | Mod: S$PBB,,, | Performed by: INTERNAL MEDICINE

## 2025-07-10 NOTE — TELEPHONE ENCOUNTER
Patient was given AVS with apt details. Patient did not sign consents. Patient will be out of country until late August. Patient will come by or message our office when she is back. Order for em is not sent. Will need to fax order and consents over to central scheduling once consents are signed. 7/10/25 LRA

## 2025-07-10 NOTE — PATIENT INSTRUCTIONS
Schedule esophageal manometry.    If any tests, procedures, or imaging has been ordered and you are not contacted to schedule within 1-2 weeks, then you may call the central scheduling department directly at [phone].   Please notify my office if you have not been contacted within two weeks after any procedures, submitting any samples (biopsies, blood, stool, urine, etc.) or after any imaging (X-ray, CT, MRI, etc.).

## 2025-07-10 NOTE — PROGRESS NOTES
Clinic Note    Reason for visit:  The primary encounter diagnosis was Weight loss. Diagnoses of Epigastric pain, Constipation, unspecified constipation type, Dysphagia, unspecified type, Gastroesophageal reflux disease, unspecified whether esophagitis present, and History of colon polyps were also pertinent to this visit.    PCP: Gustabo Avilez       HPI:  This is a 75 y.o. female who is here for a follow up. Patient with weight loss, epigastric pain, constipation. 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 Ramsey. Recommended MiraLax titration and SP last OV. She had a esophagram 2 days ago due to dysphagia. She has been having dysphagia mainly with pills. Sometimes will have to throw the pill up. Also has had an episode with an apple. She denies any reflux. She is on omeprazole 40mg daily. Has had improvement of abdominal pain but not sure if it is due to omeprazole. Reports no longer would classify it as pain but more of a discomfort. She no longer is having problems with constipation. Only had to take MiraLax once since last OV. She has neck pain and was previously on NSAIDs. No longer has been taking NSAIDs.      Had rapid weight loss that was unintentional and fatigue. Had CT chest and labs. Found to have mycobacterium avium complex 10/2024 and bronchiectasis and will be on triple therapy antibiotics for 18-24 months. She is taking rifampin 300 mg, ethambutol 400 mg, and azithromycin 500 mg all three times per week.     Weight today 122#  Weight 5/5/2025 123#  Weight 2/14/2025 130#  Weight 6/2024: 145#     Esophagram 7/8/2025 small gastroesophageal reflux, otherwise normal.  5/15/2025 CT Abd w/IV: liver cysts. No GB. Pancreas normal. Mild wall thickening of distal stomach suggesting gastritis.     5/13/2025: Na 134L, Cr 0.74, BMP onl   2/14/2025: Cr 0.71, CMP wnl     7/18/2024 normal sucrase activity.      EGD/Colonoscopy 11/8/2024: Mild distal gastritis  (potentially from prep). Sigmoid diverticulosis. IH/EH. Normal TI/colonic mucosa otherwise. DBx nl, GBx wnl w/o Hp, EBx benign, repeat colonoscopy in 5 years.     CT abd w/ and w/o 12/2020: A few calcified splenic granulomas. At least 10 hepatic cysts of varying sizes are seen including a dominant left hepatic 2.8 cm cyst. No GB. Dilated CBD to 1.4 cm could also be related to cholecystectomy sequela. Pancreatic duct is mildly prominent at 0.3 cm. No discrete pancreatic mass is identified. Duodenal diverticulum.      Colonoscopy 6/12/2019: Sphincter tone seemed adequate. Moderate diverticulosis of the sigmoid colon. Small IH. CBx nl, 1 TA. Repeat colon in 5 yr.      EGD 3/7/2016: GBx react gastropathy w/o Hp. DBx nl    Review of Systems   Constitutional:  Positive for fatigue and unexpected weight change. Negative for fever.   HENT:  Negative for mouth sores, postnasal drip, sore throat and trouble swallowing.    Eyes:  Negative for pain, discharge and eye dryness.   Respiratory:  Positive for cough, choking and shortness of breath. Negative for apnea, chest tightness and wheezing.    Cardiovascular:  Negative for chest pain, palpitations and leg swelling.   Gastrointestinal:  Positive for abdominal distention, change in bowel habit and constipation. Negative for abdominal pain, anal bleeding, blood in stool, diarrhea, nausea, rectal pain, vomiting, reflux and fecal incontinence.   Genitourinary:  Negative for bladder incontinence, dysuria and hematuria.   Musculoskeletal:  Negative for arthralgias, back pain and joint swelling.   Integumentary:  Negative for color change and rash.   Allergic/Immunologic: Negative for environmental allergies and food allergies.   Neurological:  Negative for seizures and headaches.   Hematological:  Negative for adenopathy. Does not bruise/bleed easily.        Past Medical History:   Diagnosis Date    Arthritis     Asthma     BMI 21.0-21.9, adult     Bronchiectasis, uncomplicated      Essential (primary) hypertension     High cholesterol     Mycobacterium avium complex 10/2024    Personal history of colonic polyps      Past Surgical History:   Procedure Laterality Date    BRONCHOSCOPY      CATARACT EXTRACTION      CHOLECYSTECTOMY  2016    COLONOSCOPY      EGD - EXTERNAL RESULT      HIP REPLACEMENT ARTHROPLASTY Right     2022    LUMPECTOMY, BREAST      TONSILLECTOMY       Family History   Problem Relation Name Age of Onset    Breast cancer Mother      Heart disease Mother      Heart disease Father      Breast cancer Maternal Aunt      Colon cancer Neg Hx      Esophageal cancer Neg Hx      Irritable bowel syndrome Neg Hx      Liver cancer Neg Hx      Liver disease Neg Hx      Rectal cancer Neg Hx      Stomach cancer Neg Hx      Celiac disease Neg Hx       Social History[1]  Review of patient's allergies indicates:   Allergen Reactions    Ramipril Shortness Of Breath    Arb-angiotensin receptor antagonist     Clindamycin Diarrhea and Other (See Comments)     Blood pressure spiked    Penicillins         Medication List with Changes/Refills   Current Medications    ALBUTEROL (PROVENTIL/VENTOLIN HFA) 90 MCG/ACTUATION INHALER    INHALE 2 PUFF AS DIRECTED EVERY FOUR HOURS AS NEEDED AS NEEDED FOR WHEEZING OR SHORTNESS OF BREATH    ASPIRIN (ECOTRIN) 81 MG EC TABLET    1 tablet Orally Once a day for 30 day(s)    ATORVASTATIN (LIPITOR) 40 MG TABLET    Take 40 mg by mouth.    AZITHROMYCIN (ZITHROMAX) 500 MG TABLET    500 mg 3 (three) times a week.    CALCIUM CARBONATE-VITAMIN D3 600 MG-20 MCG (800 UNIT) CHEW    1 tablet with a meal Orally Once a day for 30 day(s)    CICLOPIROX 1 % SHAMPOO    WASH HAIR 2 TO 3 TIMES A WEEK .    CLOBETASOL (TEMOVATE) 0.05 % EXTERNAL SOLUTION    APPLY TO SCALP 1-2 TIMES A DAY.    ETHAMBUTOL (MYAMBUTOL) 400 MG TAB    1,600 mg 3 (three) times a week.    HYDROCHLOROTHIAZIDE (HYDRODIURIL) 12.5 MG TAB    Take 12.5 mg by mouth once daily.    LOSARTAN (COZAAR) 100 MG TABLET    Take 100  "mg by mouth.    METOPROLOL SUCCINATE (TOPROL-XL) 100 MG 24 HR TABLET    Take 100 mg by mouth every morning.    MONTELUKAST (SINGULAIR) 10 MG TABLET    1 tablet Orally Once a day for 30 day(s)    OMEPRAZOLE (PRILOSEC) 40 MG CAPSULE    Take 1 capsule (40 mg total) by mouth once daily.    POTASSIUM CHLORIDE (KLOR-CON) 10 MEQ TBSR    1 tablet with food Orally 4 times a week    RIFAMPIN (RIFADIN) 300 MG CAPSULE    300 mg 3 (three) times a week.         Vital Signs:  /65 (BP Location: Left arm, Patient Position: Sitting)   Pulse (!) 58   Ht 5' 4" (1.626 m)   Wt 55.3 kg (122 lb)   SpO2 99%   BMI 20.94 kg/m²         Physical Exam  Vitals reviewed.   Constitutional:       General: She is awake. She is not in acute distress.     Appearance: Normal appearance. She is well-developed. She is not ill-appearing, toxic-appearing or diaphoretic.   HENT:      Head: Normocephalic and atraumatic.      Nose: Nose normal.      Mouth/Throat:      Mouth: Mucous membranes are moist.      Pharynx: Oropharynx is clear. No oropharyngeal exudate or posterior oropharyngeal erythema.   Eyes:      General: Lids are normal. Gaze aligned appropriately. No scleral icterus.        Right eye: No discharge.         Left eye: No discharge.      Conjunctiva/sclera: Conjunctivae normal.   Neck:      Trachea: Trachea normal.   Cardiovascular:      Rate and Rhythm: Normal rate and regular rhythm.      Pulses:           Radial pulses are 2+ on the right side and 2+ on the left side.   Pulmonary:      Effort: Pulmonary effort is normal. No respiratory distress.      Breath sounds: No stridor. No wheezing.   Chest:      Chest wall: No tenderness.   Abdominal:      General: Bowel sounds are normal. There is no distension.      Palpations: Abdomen is soft. There is no fluid wave, hepatomegaly or mass.      Tenderness: There is no abdominal tenderness. There is no guarding or rebound.   Musculoskeletal:         General: No tenderness or deformity.      " Cervical back: No tenderness.      Right lower leg: No edema.      Left lower leg: No edema.   Lymphadenopathy:      Cervical: No cervical adenopathy.   Skin:     General: Skin is warm and dry.      Capillary Refill: Capillary refill takes less than 2 seconds.      Coloration: Skin is not cyanotic, jaundiced or pale.   Neurological:      General: No focal deficit present.      Mental Status: She is alert and oriented to person, place, and time.      Motor: No tremor.   Psychiatric:         Attention and Perception: Attention normal.         Mood and Affect: Mood and affect normal.         Speech: Speech normal.         Behavior: Behavior normal. Behavior is cooperative.            All of the data above and below has been reviewed by myself and any further interpretations will be reflected in the assessment and plan.   The data includes review of external notes, and independent interpretation of lab results, procedures, x-rays, and imaging reports.      Assessment:  Weight loss    Epigastric pain    Constipation, unspecified constipation type    Dysphagia, unspecified type  -     Ambulatory Referral to External Surgery    Gastroesophageal reflux disease, unspecified whether esophagitis present    History of colon polyps         Colon due 2029.   Bloating/distention after eating. Sucrase activity nl. EGD wnl. Certain foods make it worse. Epigastric pain. Has improved since last visit. Continuing to lose weight. Weight loss likely due to MAC infection. 2/2025 Cr nl.  Weight stable  On omeprazole 40mg daily.  Constipation, discussed MiraLAX and Squatty Potty.   Dysphagia- EGD/Esophagram-NL. Recommend EM      Recommendations:    Schedule esophageal manometry.    If any tests, procedures, or imaging has been ordered and you are not contacted to schedule within 1-2 weeks, then you may call the central scheduling department directly at [phone].     Risks, benefits, and alternatives of medical management, any  associated procedures, and/or treatment discussed with the patient. Patient given opportunity to ask questions and voices understanding. Patient has elected to proceed with the recommended care modalities as discussed.    Instructed patient to notify my office if they have not been contacted within two weeks after any procedures, submitting any samples (biopsies, blood, stool, urine, etc.) or after any imaging (X-ray, CT, MRI, etc.).     Follow up as scheduled.    Order summary:  Orders Placed This Encounter   Procedures    Ambulatory Referral to External Surgery      This assessment, plan, and documentation was performed in collaboration with Ela Smith NP.     This document may have been created using a voice recognition transcribing system. Incorrect words or phrases may have been missed during proofreading. Please interpret accordingly or contact me for clarification.     Suyapa Dowling MD         [1]   Social History  Tobacco Use    Smoking status: Never    Smokeless tobacco: Never   Substance Use Topics    Alcohol use: Not Currently    Drug use: Never

## 2025-07-10 NOTE — LETTER
July 10, 2025        Gustabo Avilez MD  [address]  [address]  [place] [address]             [facility]  [address] [address]  Phone: [phone]  Fax: [phone]   Patient: Bethanie Wilcox   MR Number: [id number]   YOB: 1950   Date of Visit: 7/10/2025       Dear Dr. Avilez:    Thank you for referring Bethanie Wilcox to me for evaluation. Attached you will find relevant portions of my assessment and plan of care.    If you have questions, please do not hesitate to call me. I look forward to following Bethanie Wilcox along with you.    Sincerely,      Suyapa Dowling MD            CC  No Recipients    Enclosure